# Patient Record
Sex: FEMALE | Race: NATIVE HAWAIIAN OR OTHER PACIFIC ISLANDER
[De-identification: names, ages, dates, MRNs, and addresses within clinical notes are randomized per-mention and may not be internally consistent; named-entity substitution may affect disease eponyms.]

---

## 2018-10-02 ENCOUNTER — HOSPITAL ENCOUNTER (OUTPATIENT)
Dept: HOSPITAL 80 - FLD | Age: 18
Setting detail: OBSERVATION
Discharge: HOME | End: 2018-10-02
Attending: OBSTETRICS & GYNECOLOGY | Admitting: OBSTETRICS & GYNECOLOGY
Payer: COMMERCIAL

## 2018-10-02 DIAGNOSIS — Z3A.15: ICD-10-CM

## 2018-10-02 DIAGNOSIS — O99.89: Primary | ICD-10-CM

## 2018-10-02 DIAGNOSIS — R10.32: ICD-10-CM

## 2018-10-02 LAB — PLATELET # BLD: 222 10^3/UL (ref 150–400)

## 2018-10-02 PROCEDURE — G0378 HOSPITAL OBSERVATION PER HR: HCPCS

## 2018-10-02 NOTE — GHP
DATE OF ADMISSION:  10/02/2018



This is a triage observation note



ADMITTING DIAGNOSIS:  Intrauterine pregnancy at 15 and 4/7 weeks gestation with abrupt onset of left 
lower quadrant pain and nausea.  Evaluate for possible ovarian torsion or other etiologies.



HISTORY OF PRESENT ILLNESS:  The patient is a 18-year-old,  1, para 0, with an unsure last men
strual period but an estimated due date of 2019 that was set by an 8-week ultrasound.  She has 
had good prenatal care at [facility] since registration at 8 weeks' gestation and had a rel
atively uncomplicated course.  On the morning of the 2nd, the patient describes waking up and having 
increased nausea.  She had significant 1st trimester nausea, but it had been improving over the previ
ous few weeks.  On the morning of the 2nd, she woke up and said that it was much worse.  However, she
 did not have any vomiting episodes.  She presented to work at [employer] [facility]
ox[facility] 7 a.m. she had an abrupt onset of left lower quadrant pain which caused her to have worse n
ausea and become tearful.  She describes her pain as 8/10, stabbing in nature, pinpoint of her left l
ower quadrant.  She denies any uterine cramping.  No leakage of fluid.  No vaginal bleeding.  No abno
rmal discharge.  No dysuria, urgency or frequency.  No difficulty with bowel or bladder.  She has had
 no evidence of constipation and in fact had a normal bowel movement this morning and it did not caus
e her pain.  She says she was sitting at rest at work working on paperwork.  She was not doing anythi
ng active, lifting or twisting in any way.  The patient presented to [facility] for evaluat
ion.



PHYSICAL EXAMINATION:  VITAL SIGNS:  She is afebrile.  Vital signs are stable.  GENERAL:  She is well
-developed, well-nourished  female in moderate distress secondary to abdominal pain.  LUNGS: 
 Clear to auscultation bilaterally.  HEART:  Regular rate and rhythm.  No murmur.  ABDOMEN:  Soft, mi
ldly distended.  She has hypoactive bowel sounds.  Pinpoint tenderness in the left lower quadrant wit
h voluntary guarding.  No rebound.  GENITOURINARY:  Uterus:  Fundus is firm, umbilicus -3, nontender,
 and no right adnexal tenderness.  Pelvic exam was difficult because of patient's distress.  Normal e
xternal genitalia.  Cervix is long and closed and firm.  No abnormal discharge or vaginal bleeding.  
The patient had pain with uterine as well as on the adnexal cyst.



STUDIES:  She had an ultrasound evaluation in our office.  The ultrasound revealed a normal active fe
tus, a 15 week size, heart rate of 147, normal ESTELA with an MVP of 4.19. Her left ovary was larger in 
size than her right and it is large in size and measurements from a month and a half ago.  It was 5.1
8 x 1.76 x 2.09 and this was a point of her tenderness.  It was difficult to see Doppler flow to that
 left ovary.  Right ovary was normal in size, normal flow.  She has no masses on her left ovary.  No 
free fluid.  No abnormalities observed on that side.



PAST MEDICAL HISTORY:  The patient has no past medical history or any medical problems.  She had a mo
uth injury at age 5 and fell and had to have that repaired.



PAST SURGICAL HISTORY:  No surgical history.



SOCIAL HISTORY:  She is a former smoker.  She quit with her pregnancy diagnosis.



ALLERGIES:  She has no known drug allergies.



CURRENT MEDICATIONS:  Her only current medications include prenatal vitamins.



FAMILY HISTORY:  No significant family history.



REVIEW OF SYSTEMS:  A 10-point review of system is performed and is negative except for pertinent pos
itives as above in HPI.



ASSESSMENT AND PLAN:  18-year-old  1, para 0, at 15 and 4/7 weeks gestation with abrupt onset 
left lower quadrant pain of unknown etiology.  The patient will be kept for observation for now and s
erial exams.  We will do IV hydration and give her Zofran and morphine for pain.  Check CBC, CMP, jd
lase, lipase and urinalysis and I may consider repeating her pelvic ultrasound in a few hours if she 
is not improved.





Job #:  [id number]

## 2019-02-01 ENCOUNTER — HOSPITAL ENCOUNTER (OUTPATIENT)
Dept: HOSPITAL 80 - FLD | Age: 19
Setting detail: OBSERVATION
Discharge: HOME | End: 2019-02-01
Attending: OBSTETRICS & GYNECOLOGY | Admitting: OBSTETRICS & GYNECOLOGY
Payer: COMMERCIAL

## 2019-02-01 ENCOUNTER — HOSPITAL ENCOUNTER (INPATIENT)
Dept: HOSPITAL 80 - FLD | Age: 19
LOS: 3 days | Discharge: HOME | End: 2019-02-04
Attending: OBSTETRICS & GYNECOLOGY | Admitting: OBSTETRICS & GYNECOLOGY
Payer: COMMERCIAL

## 2019-02-01 DIAGNOSIS — O26.893: Primary | ICD-10-CM

## 2019-02-01 DIAGNOSIS — Z3A.32: ICD-10-CM

## 2019-02-01 DIAGNOSIS — N39.490: ICD-10-CM

## 2019-02-01 LAB — PLATELET # BLD: 209 10^3/UL (ref 150–400)

## 2019-02-01 PROCEDURE — G0378 HOSPITAL OBSERVATION PER HR: HCPCS

## 2019-02-01 PROCEDURE — 59025 FETAL NON-STRESS TEST: CPT

## 2019-02-01 NOTE — SOAPPROG
SOAP Progress Note


Assessment/Plan: 


Assessment: 18  at 32w6d with no evid of ROM - amnisure neg.  Reassuring 

FHR tracing. 


Sounds like she was having stress or overflow incontinence. 





Plan: Will have her leave a clean catch UA, then dc home. 


PROM and PTL precautions reviewed. 


FU in office as previously scheduled in next 2 weeks. 





> 10 min spent face to face, 80% in counseling. 





Rajwinder Sheriff MD, FACOG


[facility]








19 19:58








Subjective: 


Pt reports gush of fluid this morning on her way to go to the bathroom to void, 

called office and left message.  Did not receive a call back, so came in for 

work to [employer] for shift at 11:00, then had another trickle on her way to the 

bathroom to void again, so went to the office and was told to go to L&D for 

evaluation. 


Has not had any leaking since then.  No dysuria or urgency.  Good FM, no VB. No 

contractions. 


Preg c/b asymptomatic bacteruria at [facility], followed by neg [facility]. 





Objective: 


VSS, afeb


 reactive, Cat 1


gen - pleasant, NAD


abd - gravid, soft, NT


back - no CVAT





- Time Spent With Patient


Time Spent With Patient: 





10 min





- Pending Discharge


Pending Discharge Within 24 Hours: Yes


Pending Discharge Within 48 Hours: Yes


Pending Discharge Date: 19


Pending Discharge Time: 11:00





ICD10 Worksheet


Patient Problems: 


 Problems











Problem Status Onset


 


Urinary incontinence Acute  














- ICD10 Problem Qualifiers


(1) Urinary incontinence


Qualifiers: 


   Urinary Incontinence type: overflow incontinence   Qualified Code(s): 

N39.490 - Overflow incontinence

## 2019-02-01 NOTE — PDGENHP
History and Physical





- Chief Complaint


CONTRACTIONS





- History of Present Illness


19 yo G1 at 32w6d by US at 8w3d with contractions since 1930.  She had been 

here in L&D for evaluation for LOF earlier this afternoon, was discharged home 

at 1700, and amnisure was negative.  She was evaluated, and had not been having 

any contractions. 


She went home to rest, and suddenly she she started having contractions.  She 

called and I encouraged her to hydrate well and continue to rest and call me 

back if no resolution.  She called back an hour later with more painful 

contractions. 


NO LOF since she left.  She did not have intercourse after leaving. 





Prenatal course has been uncomplicated until today, except for asymptomatic 

bacteruria which was treated in early pregnancy and FU urine culture was 

negative. 





O pos


Rubell NON immune


GBS unknown











History Information





- Allergies/Home Medication List


Allergies/Adverse Reactions: 








No Allergies [NKDA] Allergy (Verified 10/02/18 11:47)


 





I have personally reviewed and updated: family history, medical history, social 

history, surgical history





- Past Medical History


no pertinent PMH





- Surgical History


Additional surgical history: none





- Family History


Positive for: diabetes type II (M, MGM)


Additional family history: kidney stones - M





- Social History


Smoking Status: Former smoker


Alcohol Use: None (in preg)


Drug Use: None





Review of Systems


Review of Systems: 





ROS: 10pt was reviewed & negative except for what was stated in HPI & below





Physical Exam


Physical Exam: 


97.6  97  123/81


 reactive, variable decels with contractions


toco - q 2-3 min


US done - cephalic, ESTELA 9.45 cm


SVE 4  / 80 / with BBOW








Constitutional: no apparent distress (when not having a contraction- very 

uncomfortable with contractions), appears nourished


Eyes: PERRL, anicteric sclera, EOMI


Ears, Nose, Mouth, Throat: moist mucous membranes, hearing normal, ears appear 

normal


Cardiovascular: regular rate and rhythym, no murmur, rub, or gallop


Respiratory: no respiratory distress, no rales or rhonchi, clear to auscultation


Gastrointestinal: normoactive bowel sounds


Genitourinary: no bladder fullness, other (bloody show present)


Skin: warm, normal color


Musculoskeletal: full muscle strength


Neurologic: AAOx3


Psychiatric: interacting appropriately, not anxious


Lymph, Heme, Immunologic: no cervical LAD





Lab Data & Imaging Review





 19 23:00

















WBC  13.50 10^3/uL (3.80-9.50)  H  19  23:00    


 


RBC  4.75 10^6/uL (4.18-5.33)   19  23:00    


 


Hgb  13.6 g/dL (12.6-16.3)   19  23:00    


 


Hct  42.2 % (38.0-47.0)   19  23:00    


 


MCV  88.8 fL (81.5-99.8)   19  23:00    


 


MCH  28.6 pg (27.9-34.1)   19  23:00    


 


MCHC  32.2 g/dL (32.4-36.7)  L  19  23:00    


 


RDW  12.2 % (11.5-15.2)   19  23:00    


 


Plt Count  209 10^3/uL (150-400)   19  23:00    


 


MPV  11.0 fL (8.7-11.7)   19  23:00    


 


Neut % (Auto)  66.8 % (39.3-74.2)   19  23:00    


 


Lymph % (Auto)  24.7 % (15.0-45.0)   19  23:00    


 


Mono % (Auto)  6.9 % (4.5-13.0)   19  23:00    


 


Eos % (Auto)  0.8 % (0.6-7.6)   19  23:00    


 


Baso % (Auto)  0.4 % (0.3-1.7)   19  23:00    


 


Nucleat RBC Rel Count  0.0 % (0.0-0.2)   19  23:00    


 


Absolute Neuts (auto)  9.01 10^3/uL (1.70-6.50)  H  19  23:00    


 


Absolute Lymphs (auto)  3.34 10^3/uL (1.00-3.00)  H  19  23:00    


 


Absolute Monos (auto)  0.93 10^3/uL (0.30-0.80)  H  19  23:00    


 


Absolute Eos (auto)  0.11 10^3/uL (0.03-0.40)   19  23:00    


 


Absolute Basos (auto)  0.05 10^3/uL (0.02-0.10)   19  23:00    


 


Absolute Nucleated RBC  0.00 10^3/uL (0-0.01)   19  23:00    


 


Immature Gran %  0.4 % (0.0-1.1)   19  23:00    


 


Immature Gran #  0.06 10^3/uL (0.00-0.10)   19  23:00    











Assessment & Plan


Assessment: 


18 G1 at 32w6d in PTL - progressing rapidly with BBOW, no current evid of ROM


1) Betamethasone given at 2257


2) GBS unknown- amp 2 gm given at 2315


3) tocolysis attempt - nifedipine 20 mg given at 2336


NNP aware and present


Pt requesting epidural, tried nitrous oxide with minimal relief. 


Anticipate . 


Rajwinder Sheriff MD, FACOG


[facility]

## 2019-02-02 RX ADMIN — ACETAMINOPHEN PRN MG: 325 TABLET ORAL at 05:50

## 2019-02-02 RX ADMIN — IBUPROFEN PRN MG: 600 TABLET ORAL at 12:39

## 2019-02-02 RX ADMIN — AMPICILLIN SODIUM SCH: 1 INJECTION, POWDER, FOR SOLUTION INTRAMUSCULAR; INTRAVENOUS at 09:45

## 2019-02-02 RX ADMIN — ACETAMINOPHEN PRN MG: 325 TABLET ORAL at 18:31

## 2019-02-02 RX ADMIN — DOCUSATE SODIUM PRN MG: 100 CAPSULE, LIQUID FILLED ORAL at 09:41

## 2019-02-02 RX ADMIN — IBUPROFEN PRN MG: 600 TABLET ORAL at 18:31

## 2019-02-02 RX ADMIN — ACETAMINOPHEN PRN MG: 325 TABLET ORAL at 12:39

## 2019-02-02 NOTE — OBDEL
Birth Info


Birth Type: Vaginal


Presentation at Delivery: Vertex


L&D Analgesia/Anesthesia Type: Epidural


GBS+: No (unknown)


Antibiotic Used for + GBS: Ampicillin


Intrapartum Medications: 





 











Generic Name Dose Route Start Last Admin





  Trade Name Bobby  PRN Reason Stop Dose Admin


 


Nifedipine  20 mg  19 23:31  19 23:31





  Procardia  PO  19 23:32  20 mg





  ONCE ONE   Administration














Discontinued Medications














Generic Name Dose Route Start Last Admin





  Trade Name Tylerq  PRN Reason Stop Dose Admin


 


Betamethasone Acet/Betameth SodPhos  12.5 mg  19 23:00  19 23:12





  Celestone Soluspan  IM  19 23:01  12.5 mg





  ONCE ONE   Administration


 


Ampicillin Sodium 2 gm/ Sodium  110 mls @ 220 mls/hr  19 00:00  19 

23:15





  Chloride  IV  19 00:29  110 mls





  ONCE ONE   Administration





  Protocol   














- Infant Care Provider


Pediatrician/NNP: [facility] [place]





- [facility] Course


Intrapartum: 


Pt arrived at 2230 with regular contractions, 3-4 cm dilated with a BBOW.  


She was given beta-methasone, ampicillin for  unknown GBS, nifedipine 

for attempted tocolysis, then received an epidural. 


She then became comfortable for a short time, until a fetal bradycardic episode 

was noted and AROM performed - with a copious amount of bloody fluid. She 

pushed for about 10 minutes. 


19 02:27





19 02:32





Indications for Delivery: Spontaneous Labor





Vaginal Delivery





- Delivery Provider


Delivery Physician/CNM: Rajwinder Sheriff





- Labor and Delivery


Onset of Contractions Date: 19


Onset of Contractions Time: 19:30


Onset of Contractions Type: Spontaneous


Rupture of Membranes Date: 19


Rupture of Membranes Time: 00:59


Rupture of Membranes Type: Artificial


Amniotic Fluid Color: Bloody


Dilation Complete Date: 19


Dilation Complete Time: 00:57


Placenta Delivery Date: 19


Placenta Delivery Time: 01:14


Total Hours of Labor: 5


Non-surgical Procedures: Amniotomy


Laceration: Other (Specify) (subclitoral laceration repaired as was not 

hemostatic until repaired)


Repair: 3-0, Vicryl


Vaginal Sponge Count Correct: Yes


Vaginal Needle Count Correct: Yes


Vaginal Sweep Performed: Yes


EBL: 250


Delivery Events: Nuchal Cord (nuchal cord reduced on perineum, delivered 

through body cord, low tone)


Delivery Comment: 





Was called to room due to low fetal heart tones.  Pt was comfortable with 

epidural and had a bulging bag of water and was completely dilated.  Once NNP 

and NICU team were ready. AROM was performed- copious amounts of bloody fluid 

was released. She pushed for about 10 minutes, with delivery of fetal vertex.  

Nuchal cord reduced on perineum, delivered through body cord.  Hypotonia noted, 

so at direction of NNP, cord was immediately clamped and cut and infant handed 

to NNP.  Apgars 2 at 1 min and 7 at 5 minutes.  Art blood gas 7.10 V 7.16, BE -

15.1.  Placenta delivered spontaneously 4 min later.  it was noted that about 1/

3 of membrane surface was covered with dark adherent clot. Examination showed 

an actively bleeding subclitoral laceration, which was immediately repaired 

with 3.0 Vicryl. Pt tolerated the procedure well.  Baby was taken to the NICU 

by the NNP. 


Cord Gases: 





 Cord Gases











Cord Blood PCO2  52 mmHg (37-60)   19  01:20    


 


Cord Base Excess  -15.1 mEq/L (-13.6--3.2)  L  19  01:20    


 


Cord ABG pH  7.10  (7.10-7.37)   19  01:20    


 


Cord VBG pH  7.16  (7.20-7.42)  L  19  01:20    














- Medications


Labor Augmentation/Induction Methods Used: None





 Operative Report





- Delivery


Cord Gases: 





 Cord Gases











Cord Blood PCO2  52 mmHg (37-60)   19  01:20    


 


Cord Base Excess  -15.1 mEq/L (-13.6--3.2)  L  19  01:20    


 


Cord ABG pH  7.10  (7.10-7.37)   19  01:20    


 


Cord VBG pH  7.16  (7.20-7.42)  L  19  01:20    














[place] Birth Data


SINDI: 19


Gestational Age: 33 week(s) and 0 day(s)


  ** [place]


Delivery Date: 19


Delivery Time: 01:10 ("Lauro")


Sex of Infant: Female


[place] Weight (gm): 1728 g (minimal tone, to NNP immediately at her request - 

no delay in cord clamping)


Apgar Score (1 Min): 2


Apgar Score (5 Min): 7





ICD10 Worksheet


Patient Problems: 


 Problems











Problem Status Onset


 


 delivery Acute  


 


Urinary incontinence Acute  














- ICD10 Problem Qualifiers


(1)  delivery

## 2019-02-02 NOTE — PDANEPAE
ANE History of Present Illness





Labor 9.5 cm requesting epidural





ANE Past Medical History





- Cardiovascular History


Hx Hypertension: No





- Pulmonary History


Hx Asthma/Reactive Airway Disease: No


Hx Sleep Apnea: No





- Chronic Pain History


Chronic Pain: No





ANE Review of Systems


Review of Systems: 








ANE Patient History





- Allergies


Allergies/Adverse Reactions: 








No Allergies [NKDA] Allergy (Verified 10/02/18 11:47)


 








- Anes Hx


Anes Hx: no prior problems (No prior epidural)





- Smoking Hx


Smoking Status: Former smoker





- Alcohol Use


Alcohol Use: None (in preg)





ANE Labs/Vital Signs





- Labs


Result Diagrams: 


 02/01/19 23:00








ANE Physical Exam





- Airway


Neck exam: FROM


Mallampati Score: Class 2


Mouth exam: normal dental/mouth exam





- Pulmonary


Pulmonary: no respiratory distress





- Cardiovascular


Cardiovascular: regular rate and rhythym





- ASA Status


ASA Status: II





ANE Anesthesia Plan


Anesthesia Plan: epidural

## 2019-02-02 NOTE — OBPP
PostPartum Progress Note


Assessment/Plan: 


Assessment:


19 y/o G 1  PPD #0 s/p  @ 33 weeks. 























Plan:


Ibuprofen and Tylenol PRN.  Lactation support and normal pp care. 


19 14:48





Subjective/Postpartum Course: 





19 14:46


Pt is doing well, she has soreness with voiding and some cramping.  She is 

taking Ibuprofen and Tylenol and doing well.  She is beginning to pump and 

getting small amounts of colustrum.  Baby is on CPAP stable in the NICU.  


Objective: 





 





 19 23:00 





 











Patient ABO/Rh  O POSITIVE   19  23:00    








 











Temp Pulse Resp BP Pulse Ox


 


 36.4 C   95   16   108/73   96 


 


 19 08:00  19 08:00  19 08:00  19 08:00  19 08:00











Uterine Position/Fundal Height: Umbilicus -2


Uterine Tone: Firm





PostPartum Physical Exam





- Physical Exam


General Appearance: alert, no apparent distress


Neck: non-tender, full range of motion, supple


Respiratory: chest non-tender, lungs clear, normal breath sounds


Cardiac/Chest: regular rate, rhythm


Abdomen: normal bowel sounds


Extremities: swelling (no), Chaka's sign (neg)

## 2019-02-03 RX ADMIN — ACETAMINOPHEN PRN MG: 325 TABLET ORAL at 21:14

## 2019-02-03 RX ADMIN — ACETAMINOPHEN PRN MG: 325 TABLET ORAL at 00:10

## 2019-02-03 RX ADMIN — IBUPROFEN PRN MG: 600 TABLET ORAL at 21:14

## 2019-02-03 RX ADMIN — IBUPROFEN PRN MG: 600 TABLET ORAL at 06:23

## 2019-02-03 RX ADMIN — DOCUSATE SODIUM PRN MG: 100 CAPSULE, LIQUID FILLED ORAL at 00:10

## 2019-02-03 RX ADMIN — IBUPROFEN PRN MG: 600 TABLET ORAL at 00:09

## 2019-02-03 RX ADMIN — Medication SCH EACH: at 13:40

## 2019-02-03 RX ADMIN — Medication SCH EACH: at 21:15

## 2019-02-03 RX ADMIN — ACETAMINOPHEN PRN MG: 325 TABLET ORAL at 06:23

## 2019-02-03 RX ADMIN — DOCUSATE SODIUM PRN MG: 100 CAPSULE, LIQUID FILLED ORAL at 08:34

## 2019-02-03 RX ADMIN — IBUPROFEN PRN MG: 600 TABLET ORAL at 12:40

## 2019-02-03 NOTE — OBPP
PostPartum Progress Note


Assessment/Plan: 


Assessment: 18  PPD#1 s/p   at 33 weeks in setting of abruption.  

Baby stable in NICU.  Anemia.


Plan: Continue routine postpartum cares.  Start iron and stool softener BID. 


Rajwinder Sheriff MD, FACOG, [facility]


19 15:46








Subjective/Postpartum Course: 





19 14:46


Pt is doing well, she has soreness with voiding and some cramping.  She is 

taking Ibuprofen and Tylenol and doing well.  She is beginning to pump and 

getting small amounts of colustrum.  Baby is on CPAP stable in the NICU.  


19 15:49


Pt doing well.  Ambulating and voiding without difficulty - though stings when 

voids.  Discomfort well controlled with po meds.  Pumping.  Baby having a trial 

off CPAP and is doing well with skin to skin.  Mod lochia. 


Objective: 





 





 19 05:30 





 











Patient ABO/Rh  O POSITIVE   19  23:00    








 











Temp Pulse Resp BP Pulse Ox


 


 36.5 C   77   14   88/51 L  98 


 


 19 08:30  19 08:30  19 08:30  19 08:30  19 08:30








gen - pleasant, NAD, doing skin to skin in NICU


CV - RRR


chest - CTAB


abd - soft, fundus firm at u-3


ext - calves NT, no edema BLE





Uterine Position/Fundal Height: Umbilicus -3


Uterine Tone: Firm

## 2019-02-04 VITALS — DIASTOLIC BLOOD PRESSURE: 61 MMHG | SYSTOLIC BLOOD PRESSURE: 105 MMHG

## 2019-02-04 RX ADMIN — ACETAMINOPHEN PRN MG: 325 TABLET ORAL at 09:22

## 2019-02-04 RX ADMIN — ACETAMINOPHEN PRN MG: 325 TABLET ORAL at 16:37

## 2019-02-04 RX ADMIN — IBUPROFEN PRN MG: 600 TABLET ORAL at 09:22

## 2019-02-04 RX ADMIN — DOCUSATE SODIUM PRN MG: 100 CAPSULE, LIQUID FILLED ORAL at 09:22

## 2019-02-04 RX ADMIN — IBUPROFEN PRN MG: 600 TABLET ORAL at 03:06

## 2019-02-04 RX ADMIN — ACETAMINOPHEN PRN MG: 325 TABLET ORAL at 03:06

## 2019-02-04 RX ADMIN — IBUPROFEN PRN MG: 600 TABLET ORAL at 16:37

## 2019-02-04 RX ADMIN — Medication SCH EACH: at 09:22

## 2019-02-04 NOTE — OBPP
PostPartum Progress Note


Assessment/Plan: 


Assessment:


























Plan:





Subjective/Postpartum Course: 





02/02/19 14:46


Pt is doing well, she has soreness with voiding and some cramping.  She is 

taking Ibuprofen and Tylenol and doing well.  She is beginning to pump and 

getting small amounts of colustrum.  Baby is on CPAP stable in the NICU.  


02/03/19 15:49


Pt doing well.  Ambulating and voiding without difficulty - though stings when 

voids.  Discomfort well controlled with po meds.  Pumping.  Baby having a trial 

off CPAP and is doing well with skin to skin.  Mod lochia. 


Objective: 





 





 02/03/19 05:30 





 











Patient ABO/Rh  O POSITIVE   02/01/19  23:00    








 











Temp Pulse Resp BP Pulse Ox


 


 37.0 C   69   17   105/61   97 


 


 02/04/19 08:00  02/04/19 08:00  02/04/19 08:00  02/04/19 08:00  02/04/19 08:00